# Patient Record
Sex: FEMALE | Race: WHITE | NOT HISPANIC OR LATINO | URBAN - METROPOLITAN AREA
[De-identification: names, ages, dates, MRNs, and addresses within clinical notes are randomized per-mention and may not be internally consistent; named-entity substitution may affect disease eponyms.]

---

## 2018-01-30 ENCOUNTER — OFFICE (OUTPATIENT)
Dept: URBAN - METROPOLITAN AREA CLINIC 36 | Facility: CLINIC | Age: 48
End: 2018-01-30

## 2018-01-30 VITALS
HEIGHT: 62 IN | SYSTOLIC BLOOD PRESSURE: 117 MMHG | WEIGHT: 143 LBS | TEMPERATURE: 97.9 F | DIASTOLIC BLOOD PRESSURE: 81 MMHG

## 2018-01-30 DIAGNOSIS — Z80.0 FAMILY HX OF COLON CANCER: ICD-10-CM

## 2018-01-30 DIAGNOSIS — R14.0 ABDOMINAL BLOATING: ICD-10-CM

## 2018-01-30 DIAGNOSIS — Z86.010 PERSONAL HISTORY COLON POLYPS: ICD-10-CM

## 2018-01-30 DIAGNOSIS — K62.5 RECTAL BLEEDING: ICD-10-CM

## 2018-01-30 DIAGNOSIS — R14.2 EXCESSIVE BELCHING: ICD-10-CM

## 2018-01-30 PROCEDURE — 99214 OFFICE O/P EST MOD 30 MIN: CPT | Performed by: INTERNAL MEDICINE

## 2018-01-30 NOTE — SERVICEHPINOTES
VANGIE PEREZ   returns today for follow-up from last visit on   2/16/2016  .    This patient is seen for evaluation of blood per rectum.   Symptoms began      months   ago.   Bleeding has occurred at a frequency of   1   times per   month  .    The patient estimates seeing   a teaspoon   of   bright red   blood in the stool.    Stools have been   normal   in consistency.    Has noted   straining with defecation   in association with the bleeding.   Previous pertinent conditions that have been diagnosed in relation to this problem include   .    The patient has tried   fiber supplements   for empiric treatment of suspected hemorrhoids.    Prior notable interventions/surgeries include   .    A colonoscopy was performed   1  year   ago.  Findings from that exam included   .    The patient complains of symptoms suggestive of excessive intestinal gas.     Reports the onset of   bloating, belching and flatulence  months   ago  .    Symptoms now occur    times per   hours  .    They may last up to    at a time  .    Medications tried so far include   Align  with   partial   relief  .    Symptoms are worsened by   eating  .   Bloating/gas is relieved by   belching and defecation  .    Specific foods that potentially trigger symptoms include   .    The patient has associated   excessive belching and excessive borborygmi   with this presentation.   Alarm symptoms reported:   blood in the stool  .

## 2018-02-02 LAB
CELIAC DISEASE COMPREHENSIVE PANEL: IMMUNOGLOBULIN A: 295 MG/DL (ref 81–463)
CELIAC DISEASE COMPREHENSIVE PANEL: INTERPRETATION: (no result)
CELIAC DISEASE COMPREHENSIVE PANEL: TISSUE TRANSGLUTAMINASE AB, IGA: <1 U/ML
HELICOBACTER PYLORI, UREA BREATH TEST: NOT DETECTED

## 2018-02-14 LAB — US ABDOMEN COMPLETE: PDF REPORT: (no result)

## 2018-06-12 ENCOUNTER — OFFICE (OUTPATIENT)
Dept: URBAN - METROPOLITAN AREA CLINIC 36 | Facility: CLINIC | Age: 48
End: 2018-06-12

## 2018-06-12 VITALS
TEMPERATURE: 97.5 F | DIASTOLIC BLOOD PRESSURE: 79 MMHG | HEIGHT: 62 IN | SYSTOLIC BLOOD PRESSURE: 108 MMHG | WEIGHT: 142 LBS

## 2018-06-12 DIAGNOSIS — R10.13 EPIGASTRIC PAIN: ICD-10-CM

## 2018-06-12 DIAGNOSIS — F41.1 ANXIETY STATE: ICD-10-CM

## 2018-06-12 DIAGNOSIS — Z80.0 FAMILY HISTORY OF MALIGNANT NEOPLASM OF COLON: ICD-10-CM

## 2018-06-12 DIAGNOSIS — Z86.010 PERSONAL HISTORY COLON POLYPS: ICD-10-CM

## 2018-06-12 DIAGNOSIS — K21.9 GERD: ICD-10-CM

## 2018-06-12 PROCEDURE — 99214 OFFICE O/P EST MOD 30 MIN: CPT | Performed by: INTERNAL MEDICINE

## 2018-06-12 NOTE — SERVICEHPINOTES
VANGIE PEREZ   returns today for follow-up from last visit on   1/30/2018  .    The patient complains of abdominal pain.    Symptoms began   1 - 3  days   ago with onset that was    abrupt  .    It is localized as   epigastric region   pain.    It is described as   moderate and severe   in nature.   Pain quality is described as   crampy and dull  .    It also radiates to the   .    Discomfort typically lasts   several  hours   and has a course which is   rapidly crescendo  .   It usually starts   .    Symptom triggers include   nothing specific  .  Alleviating factors include   nothing specific  .    Symptoms have been   non-progressive/stable   since onset.    Alarm features noted:   none  .   The patient also reports   .      Symptoms have caused the patient to   avoid leaving home/going out   Similar symptoms   have not   occurred in the past, associated with    Gastritis  .   Noteworthy prior abdominal interventions include   .   has been performed previously to evaluate the patient's symptoms.   Remedies tried to date include     with    .    Other pertinent testing to date includes,         pt was on heavy dose of advil and on augmentin  for dental work    until yesterday    no n/v/gi bleed   no diarrhea    or recent travel

## 2018-12-03 ENCOUNTER — OFFICE (OUTPATIENT)
Dept: URBAN - METROPOLITAN AREA CLINIC 36 | Facility: CLINIC | Age: 48
End: 2018-12-03

## 2018-12-03 VITALS
DIASTOLIC BLOOD PRESSURE: 74 MMHG | TEMPERATURE: 96.7 F | SYSTOLIC BLOOD PRESSURE: 111 MMHG | HEIGHT: 62 IN | WEIGHT: 140 LBS

## 2018-12-03 DIAGNOSIS — R14.2 EXCESSIVE BELCHING: ICD-10-CM

## 2018-12-03 DIAGNOSIS — F41.1 ANXIETY STATE: ICD-10-CM

## 2018-12-03 DIAGNOSIS — Z80.0 FAMILY HISTORY OF MALIGNANT NEOPLASM OF COLON: ICD-10-CM

## 2018-12-03 PROCEDURE — 99214 OFFICE O/P EST MOD 30 MIN: CPT | Performed by: INTERNAL MEDICINE

## 2018-12-03 NOTE — SERVICEHPINOTES
VANGIE PEREZ   returns today for follow-up from last visit on   6/12/2018  .    The patient complains of symptoms suggestive of excessive intestinal gas.     Reports the onset of   bloating, belching, eructation and flatulence  several  months   ago  .    Symptoms now occur   1 - 3   times per   day  .    They may last up to   seconds   at a time  .    Medications tried so far include   Align  with   minimal   relief  .    Symptoms are worsened by   eating  .   Bloating/gas is relieved by   nothing specific  .    Specific foods that potentially trigger symptoms include   .    The patient has associated   abdominal bloating   with this presentation.   Alarm symptoms reported:   none  .    Pt with a history of colon polyps last examination was done in 2016 and family hx of CRC(mother). Pt bowel movements are normal ie no bleed.

## 2019-03-04 ENCOUNTER — OFFICE (OUTPATIENT)
Dept: URBAN - METROPOLITAN AREA CLINIC 57 | Facility: CLINIC | Age: 49
End: 2019-03-04

## 2019-03-04 VITALS
HEART RATE: 82 BPM | RESPIRATION RATE: 19 BRPM | WEIGHT: 120 LBS | TEMPERATURE: 98.4 F | HEIGHT: 62 IN | SYSTOLIC BLOOD PRESSURE: 132 MMHG | DIASTOLIC BLOOD PRESSURE: 79 MMHG

## 2019-03-04 DIAGNOSIS — Z80.0 FAMILY HISTORY OF MALIGNANT NEOPLASM OF COLON: ICD-10-CM

## 2019-03-04 DIAGNOSIS — Z86.010 PERSONAL HISTORY COLON POLYPS: ICD-10-CM

## 2019-03-04 DIAGNOSIS — K59.00 CONSTIPATION: ICD-10-CM

## 2019-03-04 DIAGNOSIS — R14.2 EXCESSIVE BELCHING: ICD-10-CM

## 2019-03-04 DIAGNOSIS — R10.13 EPIGASTRIC PAIN: ICD-10-CM

## 2019-03-04 PROCEDURE — 99244 OFF/OP CNSLTJ NEW/EST MOD 40: CPT | Performed by: INTERNAL MEDICINE

## 2019-03-04 NOTE — SERVICEHPINOTES
The patient is a 49-year-old woman who comes in today because of a number of abdominal complaints. She has had problems for many years. 10 years ago in [place] she had a CT scan performed. She most recently had an endoscopy and colonoscopy by  3 years ago. A descending colon tubular adenoma was seen and a three-year follow-up was suggested. H. pylori was found and treated. Subsequent breath test for H. pylori was negative. Nonetheless for the past year the patient has had 2 episodes per week that last hours that are described as epigastric burning. It is unclear as to whether omeprazole or Beano helps. It is described as 5 out of 10 in severity. There have been associated eructations and may be some stress relationship. In the past she was on a medication for anxiety. She is not taking this currently. She also notes bloating. She has stools that range between [place] stool scale #2 through #5. She is constipated at times and this sometimes leads to rectal bleeding. She does take Advil every other week. She denies any diarrhea, melena, fever or chills. Her weight has been stable. 2018 CBC and CMP were normal.

## 2019-10-07 ENCOUNTER — OFFICE (OUTPATIENT)
Dept: URBAN - METROPOLITAN AREA CLINIC 36 | Facility: CLINIC | Age: 49
End: 2019-10-07

## 2019-10-07 VITALS
DIASTOLIC BLOOD PRESSURE: 84 MMHG | HEIGHT: 62 IN | HEART RATE: 74 BPM | TEMPERATURE: 98.4 F | SYSTOLIC BLOOD PRESSURE: 117 MMHG | WEIGHT: 148 LBS

## 2019-10-07 DIAGNOSIS — R14.0 ABDOMINAL BLOATING: ICD-10-CM

## 2019-10-07 DIAGNOSIS — R14.2 EXCESSIVE BELCHING: ICD-10-CM

## 2019-10-07 DIAGNOSIS — K21.9 GERD: ICD-10-CM

## 2019-10-07 DIAGNOSIS — Z86.010 PERSONAL HISTORY COLON POLYPS: ICD-10-CM

## 2019-10-07 DIAGNOSIS — Z80.0 FAMILY HX OF COLON CANCER: ICD-10-CM

## 2019-10-07 PROCEDURE — 99214 OFFICE O/P EST MOD 30 MIN: CPT | Performed by: INTERNAL MEDICINE

## 2019-10-07 NOTE — SERVICEHPINOTES
VANGIE PEREZ   returns today for follow-up from last visit on   3/4/2019  .    Follow-up of GERD was discussed.   The patient has typically complained of   heartburn and regurgitation  .      Treatment has consisted of   OTC H2 blockers  taken at   once daily  .   This therapy has been associated with   good   relief.   The patient   has not   been having breakthru GERD symptoms.  Symptoms do   not awaken the patient from sleep  .    Has been treating residual symptoms with   OTC antacids  .   Continuing symptoms may be brought on by   eating meals late at night, excess caffeine intake, excess citrus intake and eating spicy foods  .                                          The patient complains of symptoms suggestive of excessive intestinal gas.     Reports the onset of   flatulence and bloating  several  months   ago  .    Symptoms now occur   several   times per   days  .    They may last up to   minutes   at a time  .    Medications tried so far include   unspecified probiotics  with   slight   relief  .    Symptoms are worsened by   eating  .   Bloating/gas is relieved by   fasting  .    Specific foods that potentially trigger symptoms include   .    The patient has associated    with this presentation.   Alarm symptoms reported:   none  .

## 2020-06-03 ENCOUNTER — BOTOX (OUTPATIENT)
Dept: URBAN - METROPOLITAN AREA CLINIC 19 | Facility: CLINIC | Age: 50
Setting detail: DERMATOLOGY
End: 2020-06-03

## 2020-06-03 DIAGNOSIS — L57.0 ACTINIC KERATOSIS: ICD-10-CM

## 2020-06-03 PROCEDURE — OTHER BOTOX COSMETIC: OTHER

## 2020-10-01 ENCOUNTER — OFFICE (OUTPATIENT)
Dept: URBAN - METROPOLITAN AREA CLINIC 36 | Facility: CLINIC | Age: 50
End: 2020-10-01

## 2020-10-01 VITALS — HEIGHT: 62 IN

## 2020-10-01 DIAGNOSIS — K21.9 GERD: ICD-10-CM

## 2020-10-01 DIAGNOSIS — K62.5 RECTAL BLEEDING: ICD-10-CM

## 2020-10-01 DIAGNOSIS — Z80.0 FAMILY HISTORY OF MALIGNANT NEOPLASM OF COLON: ICD-10-CM

## 2020-10-01 DIAGNOSIS — Z86.010 PERSONAL HISTORY COLON POLYPS: ICD-10-CM

## 2020-10-01 PROCEDURE — G0406 INPT/TELE FOLLOW UP 15: HCPCS | Performed by: INTERNAL MEDICINE

## 2020-10-01 PROCEDURE — 99213 OFFICE O/P EST LOW 20 MIN: CPT | Performed by: INTERNAL MEDICINE

## 2020-10-01 NOTE — SERVICEHPINOTES
VANGIE PEREZ   returns today for follow-up from last visit on   10/7/2019  .    PT GERD SYMPTOMS ARE IMPROVED     ON QOD PEPCID    NO N/V/DYSPHAGIA      BM REGULAR STILL BLEEDING PER RECTUM    HX OF HEMORRHOIDS S/P SCLERO

## 2024-10-29 ENCOUNTER — OFFICE (OUTPATIENT)
Dept: URBAN - METROPOLITAN AREA CLINIC 45 | Facility: CLINIC | Age: 54
End: 2024-10-29

## 2024-10-29 VITALS
DIASTOLIC BLOOD PRESSURE: 76 MMHG | SYSTOLIC BLOOD PRESSURE: 115 MMHG | HEART RATE: 65 BPM | HEIGHT: 62 IN | WEIGHT: 147 LBS

## 2024-10-29 DIAGNOSIS — R14.0 ABDOMINAL BLOATING: ICD-10-CM

## 2024-10-29 DIAGNOSIS — K21.9 ACID REFLUX: ICD-10-CM

## 2024-10-29 DIAGNOSIS — Z80.0 FAMILY HISTORY OF MALIGNANT NEOPLASM OF COLON: ICD-10-CM

## 2024-10-29 DIAGNOSIS — Z86.010 PERSONAL HISTORY COLON POLYPS: ICD-10-CM

## 2024-10-29 PROCEDURE — 99204 OFFICE O/P NEW MOD 45 MIN: CPT | Performed by: INTERNAL MEDICINE

## 2024-10-29 RX ORDER — PANTOPRAZOLE SODIUM 40 MG/1
40 TABLET, DELAYED RELEASE ORAL
Qty: 30 | Status: ACTIVE
Start: 2024-10-29

## 2024-10-29 NOTE — SERVICENOTES
I personally reviewed:  laboratory results,  endoscopic procedure reports, endoscopy images, pathology reports, imaging studies

## 2024-10-29 NOTE — SERVICEHPINOTES
This is my first time assessment of this 54-year-old woman see me for a second opinion. Followed most recently by Dr Thayer. She reports that she suffers from heartburn which may be severe but intermittent.  Currently she is using align probiotics and occasional Pepcid.  Over the summer she was having significant pulmonary issues with bronchitis, sinusitis and asthma.  She was given antibiotics which she felt made her heartburn worse.  She initially started pantoprazole but stopped this due to fear of long-term side effects.  She is now using famotidine as needed which she does not find helpful.  In addition to heartburn she has symptoms of gas, belching and bloating.  Review of previous records show a colonoscopy in 2020 with one tubular adenoma.  She does have a family history of colon cancer.  Her last upper endoscopy was 2016 without major findings.  In addition she was recently given dicyclomine which gave her a lot of side effects.

## 2025-06-04 ENCOUNTER — OFFICE (OUTPATIENT)
Dept: URBAN - METROPOLITAN AREA CLINIC 45 | Facility: CLINIC | Age: 55
End: 2025-06-04

## 2025-06-04 VITALS — HEIGHT: 62 IN | WEIGHT: 147 LBS

## 2025-06-04 DIAGNOSIS — K21.9 ACID REFLUX: ICD-10-CM

## 2025-06-04 DIAGNOSIS — Z12.11 SCREENING FOR COLONIC NEOPLASIA: ICD-10-CM

## 2025-06-04 DIAGNOSIS — R14.0 ABDOMINAL BLOATING: ICD-10-CM

## 2025-06-04 DIAGNOSIS — Z80.0 FAMILY HISTORY OF MALIGNANT NEOPLASM OF COLON: ICD-10-CM

## 2025-06-04 PROCEDURE — 99213 OFFICE O/P EST LOW 20 MIN: CPT | Mod: 95 | Performed by: INTERNAL MEDICINE

## 2025-06-04 NOTE — SERVICENOTES
This visit performed virtually via video and audio through face-to- face telemedicine. Patient informed and consented to proceed. Received verbal consent from the patient to discuss personal health information in a telemedicine setting. Patient informed by staff that this consult may be subject to a copay and they may receive a bill if it applies. Patient confirms they are in the [facility] I personally reviewed:  laboratory results,  endoscopic procedure reports, endoscopy images, pathology reports, imaging studies

## 2025-06-04 NOTE — SERVICEHPINOTES
The patient reports she is due for colonoscopy.  This was last done 4-1/2 years ago.  Her mother was diagnosed with colon cancer in her early 50s.  She has had a few surgeries for large colon polyps which were benign.  Currently she reports that her acid reflux and bloating is under control.  She rarely uses a proton pump inhibitor and symptoms are controlled by diet.  She is now on Zepbound for weight control.  Surgeries were performed by Dr. Mynor Xie.